# Patient Record
Sex: FEMALE | Race: WHITE | HISPANIC OR LATINO | URBAN - METROPOLITAN AREA
[De-identification: names, ages, dates, MRNs, and addresses within clinical notes are randomized per-mention and may not be internally consistent; named-entity substitution may affect disease eponyms.]

---

## 2021-10-05 ENCOUNTER — NEW PATIENT (OUTPATIENT)
Dept: URBAN - METROPOLITAN AREA CLINIC 110 | Facility: CLINIC | Age: 81
End: 2021-10-05

## 2021-10-05 DIAGNOSIS — H04.123: ICD-10-CM

## 2021-10-05 DIAGNOSIS — H35.371: ICD-10-CM

## 2021-10-05 DIAGNOSIS — H53.2: ICD-10-CM

## 2021-10-05 DIAGNOSIS — H52.4: ICD-10-CM

## 2021-10-05 DIAGNOSIS — Z96.1: ICD-10-CM

## 2021-10-05 DIAGNOSIS — Z79.899: ICD-10-CM

## 2021-10-05 PROCEDURE — 92015 DETERMINE REFRACTIVE STATE: CPT

## 2021-10-05 PROCEDURE — 92004 COMPRE OPH EXAM NEW PT 1/>: CPT

## 2021-10-05 PROCEDURE — 92134 CPTRZ OPH DX IMG PST SGM RTA: CPT

## 2021-10-05 ASSESSMENT — TONOMETRY
OS_IOP_MMHG: 15
OD_IOP_MMHG: 15

## 2021-10-05 ASSESSMENT — VISUAL ACUITY
OD_PH: 20/60+1
OD_SC: 20/70
OS_SC: 20/60-1
OS_PH: 20/60

## 2022-02-27 ENCOUNTER — APPOINTMENT (EMERGENCY)
Dept: RADIOLOGY | Facility: HOSPITAL | Age: 82
End: 2022-02-27
Payer: COMMERCIAL

## 2022-02-27 ENCOUNTER — HOSPITAL ENCOUNTER (EMERGENCY)
Facility: HOSPITAL | Age: 82
Discharge: HOME | End: 2022-02-27
Attending: EMERGENCY MEDICINE
Payer: COMMERCIAL

## 2022-02-27 VITALS
DIASTOLIC BLOOD PRESSURE: 77 MMHG | SYSTOLIC BLOOD PRESSURE: 179 MMHG | RESPIRATION RATE: 18 BRPM | HEART RATE: 98 BPM | OXYGEN SATURATION: 97 %

## 2022-02-27 DIAGNOSIS — S42.213A HUMERAL SURGICAL NECK FRACTURE: ICD-10-CM
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
## 2022-02-27 PROCEDURE — 70450 CT HEAD/BRAIN W/O DYE: CPT

## 2022-02-27 PROCEDURE — 99284 EMERGENCY DEPT VISIT MOD MDM: CPT | Performed by: EMERGENCY MEDICINE

## 2022-02-27 PROCEDURE — 73200 CT UPPER EXTREMITY W/O DYE: CPT

## 2022-02-27 PROCEDURE — 99284 EMERGENCY DEPT VISIT MOD MDM: CPT

## 2022-02-27 PROCEDURE — 73030 X-RAY EXAM OF SHOULDER: CPT

## 2022-02-27 PROCEDURE — G1004 CDSM NDSC: HCPCS

## 2022-02-27 PROCEDURE — 96372 THER/PROPH/DIAG INJ SC/IM: CPT

## 2022-02-27 RX ORDER — ACETAMINOPHEN 325 MG/1
650 TABLET ORAL ONCE
Status: COMPLETED | OUTPATIENT
Start: 2022-02-27 | End: 2022-02-27

## 2022-02-27 RX ORDER — LIDOCAINE 50 MG/G
1 PATCH TOPICAL ONCE
Status: DISCONTINUED | OUTPATIENT
Start: 2022-02-27 | End: 2022-02-27 | Stop reason: HOSPADM

## 2022-02-27 RX ORDER — KETOROLAC TROMETHAMINE 30 MG/ML
15 INJECTION, SOLUTION INTRAMUSCULAR; INTRAVENOUS ONCE
Status: COMPLETED | OUTPATIENT
Start: 2022-02-27 | End: 2022-02-27

## 2022-02-27 RX ADMIN — LIDOCAINE 5% 1 PATCH: 700 PATCH TOPICAL at 16:50

## 2022-02-27 RX ADMIN — KETOROLAC TROMETHAMINE 15 MG: 30 INJECTION, SOLUTION INTRAMUSCULAR at 16:47

## 2022-02-27 RX ADMIN — ACETAMINOPHEN 650 MG: 325 TABLET ORAL at 16:49

## 2022-02-27 NOTE — DISCHARGE INSTRUCTIONS
You were seen today in the Emergency Department  Please take tylenol and ibuprofen for pain  Please follow up with your Primary Care Provider in the next 1-2 days to recheck your symptoms and to follow up on your visit to the Emergency Department today  Please also follow up with the specialists to whom I have referred you as directed on this page  Please return to the Emergency Department if you have fevers, chills, chest pain, shortness of breath, are unable to eat or drink, or have any other symptoms that concern you  Please look this over and let your nurse and/or me know if you have any further questions before you leave

## 2022-02-27 NOTE — ED ATTENDING ATTESTATION
2/27/2022  I, Alison Lai MD, saw and evaluated the patient  I have discussed the patient with the resident/non-physician practitioner and agree with the resident's/non-physician practitioner's findings, Plan of Care, and MDM as documented in the resident's/non-physician practitioner's note, except where noted  All available labs and Radiology studies were reviewed  I was present for key portions of any procedure(s) performed by the resident/non-physician practitioner and I was immediately available to provide assistance  At this point I agree with the current assessment done in the Emergency Department  I have conducted an independent evaluation of this patient a history and physical is as follows:    73-year-old woman [place]  Struck the right side of her head on the ground  Denies loss of consciousness  Not on anticoagulation  Complaining of right-sided head pain as well as right shoulder pain  No neck pain  No other complaints  Awake and alert, uncomfortable appearing  Head atraumatic normocephalic  No C-spine tenderness  There is right shoulder tenderness  Neurovascular intact  Heart regular rate rhythm, no murmurs rubs or gallops  Lungs clear to auscultation bilaterally  Imaging shows humeral head fracture, discussed with Orthopedics and will place sling with plan for outpatient follow-up  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

## 2022-02-27 NOTE — ED PROVIDER NOTES
History  Chief Complaint   Patient presents with    Fall     fall at [facility]  negative LOC  PT reports right shoulder pain     Manuela Hammer is an [de-identified]y o  year old female who presents to the ED today for evaluation of a fall that took place at the [facility] just PTA  She lost her footing and fell onto her right side, hitting the right side of her head on the ground  She tried to catch herself with her R arm  No LOC  No blood thinners  Is having pain on the right side of the head and R anterior shoulder  Was able to get off the ground without assistance and has been ambulatory since the fall without pain or unsteadiness  The patient denies headaches, lightheadedness or syncope, nausea, vomiting, chest pain, shortness of breath, abdominal pain, new back pain, numbness or tingling, pain anywhere else in body  ROS otherwise negative  History provided by:  Medical records and patient   used: No    Fall      None       History reviewed  No pertinent past medical history  History reviewed  No pertinent surgical history  History reviewed  No pertinent family history  I have reviewed and agree with the history as documented  E-Cigarette/Vaping     E-Cigarette/Vaping Substances     Social History     Tobacco Use    Smoking status: Not on file    Smokeless tobacco: Never Used   Substance Use Topics    Alcohol use: Not Currently    Drug use: Not on file        Review of Systems   Reason unable to perform ROS: please see above for ROS  All other ROS negative         Physical Exam  ED Triage Vitals   Temp Pulse Respirations Blood Pressure SpO2   -- 02/27/22 1439 02/27/22 1439 02/27/22 1439 02/27/22 1439    98 18 (!) 179/77 97 %      Temp src Heart Rate Source Patient Position - Orthostatic VS BP Location FiO2 (%)   -- 02/27/22 1439 02/27/22 1439 02/27/22 1439 --    Monitor Sitting Left arm       Pain Score       02/27/22 1647       8             Orthostatic Vital Signs  Vitals:    02/27/22 1439   BP: (!) 179/77   Pulse: 98   Patient Position - Orthostatic VS: Sitting       Physical Exam  Vitals and nursing note reviewed  Constitutional:       General: She is not in acute distress  Appearance: She is well-developed  She is not diaphoretic  HENT:      Head: Normocephalic and atraumatic  Eyes:      General: No scleral icterus  Conjunctiva/sclera: Conjunctivae normal    Neck:      Vascular: No JVD  Trachea: No tracheal deviation  Cardiovascular:      Rate and Rhythm: Normal rate and regular rhythm  Comments: No ttp chest wall  Pulmonary:      Effort: Pulmonary effort is normal  No respiratory distress  Breath sounds: Normal breath sounds  Abdominal:      General: There is no distension  Palpations: Abdomen is soft  Tenderness: There is no abdominal tenderness  Musculoskeletal:         General: No deformity  Cervical back: Neck supple  Comments: ttp R anterior shoulder joint, ROM and strength testing limited due to pain  No tenderness or deformity of clavicle or more distal regions of RUE  No sensory or pulse deficits RUE  No other evidence of trauma of extremities  No midline spinal tenderness   Skin:     General: Skin is warm and dry  Neurological:      Mental Status: She is alert  Psychiatric:         Behavior: Behavior normal          ED Medications  Medications   acetaminophen (TYLENOL) tablet 650 mg (650 mg Oral Given 2/27/22 1649)   ketorolac (TORADOL) injection 15 mg (15 mg Intramuscular Given 2/27/22 1647)       Diagnostic Studies  Results Reviewed     None                 CT upper extremity wo contrast right   Final Result by Armand Berg MD (02/27 1347)      1  Proximal humeral fracture as described   2   9 mm right upper lobe pulmonary nodule with spiculation, may represent malignancy  Based on current Fleischner Society 2017 Guidelines on incidental pulmonary nodule, recommend follow-up PET/CT        The study was marked in EPIC for significant notification  Workstation performed: [id number]         CT head without contrast   Final Result by Rakesh Briscoe DO (02/27 1531)      No acute intracranial abnormality  Microangiopathic changes  Workstation performed: [id number]         XR shoulder 2+ views RIGHT    (Results Pending)         Procedures  Procedures      ED Course                                       MDM  Number of Diagnoses or Management Options  Diagnosis management comments: Initial ED diagnostics to include CT head, x-ray right shoulder  Initial ddx includes but is not limited to: contusion, fx, dislocation, soft tissue injury, TBI  Anticipate ultimate dispo to be d/c with RTER precautions and supportive care as needed if imaging unremarkable  Amount and/or Complexity of Data Reviewed  Tests in the radiology section of CPT®: ordered and reviewed  Review and summarize past medical records: yes    Patient Progress  Patient progress: stable      Disposition  Final diagnoses:   Fall, initial encounter   Humeral surgical neck fracture     Time reflects when diagnosis was documented in both MDM as applicable and the Disposition within this note     Time User Action Codes Description Comment    2/27/2022  4:29 PM Blanquita Bell Add [Z01  AVMF] Fall, initial encounter     2/27/2022  4:29 PM Blanquita Bell Add [L31 877G] Humeral surgical neck fracture       ED Disposition     ED Disposition Condition Date/Time Comment    Discharge Stable Sun Feb 27, 2022  4:29 PM Juana Edmonds discharge to home/self care  Results of completed tests discussed  Return to ER precautions given, verbal and written, and questions answered satisfactorily                  Follow-up Information     Follow up With Specialties Details Why Contact Info Additional [address] [facility] Orthopedic Surgery Call in 1 day For specialty evaluation and/or treatment [address] [phone]  [address] [facility] [place], [address] [place] [address], [address]  [facility]          There are no discharge medications for this patient  No discharge procedures on file  PDMP Review     None           ED Provider  Attending physically available and evaluated Tj Duarte I managed the patient along with the ED Attending      Electronically Signed by         Rossy Martinez DO  02/27/22 6798

## 2022-12-01 ENCOUNTER — ESTABLISHED COMPREHENSIVE EXAM (OUTPATIENT)
Dept: URBAN - METROPOLITAN AREA CLINIC 110 | Facility: CLINIC | Age: 82
End: 2022-12-01

## 2022-12-01 DIAGNOSIS — H35.371: ICD-10-CM

## 2022-12-01 DIAGNOSIS — Z79.61: ICD-10-CM

## 2022-12-01 DIAGNOSIS — H52.4: ICD-10-CM

## 2022-12-01 DIAGNOSIS — Z96.1: ICD-10-CM

## 2022-12-01 DIAGNOSIS — M06.9: ICD-10-CM

## 2022-12-01 DIAGNOSIS — H04.123: ICD-10-CM

## 2022-12-01 PROCEDURE — 92015 DETERMINE REFRACTIVE STATE: CPT

## 2022-12-01 PROCEDURE — 92134 CPTRZ OPH DX IMG PST SGM RTA: CPT

## 2022-12-01 PROCEDURE — 92014 COMPRE OPH EXAM EST PT 1/>: CPT

## 2022-12-01 ASSESSMENT — TONOMETRY
OD_IOP_MMHG: 14
OS_IOP_MMHG: 11

## 2022-12-01 ASSESSMENT — VISUAL ACUITY
OS_PH: 20/40-1
OD_SC: 20/50-1
OS_SC: 20/50
OD_PH: 20/50+1

## 2023-01-06 ENCOUNTER — ESTABLISHED (OUTPATIENT)
Dept: URBAN - METROPOLITAN AREA CLINIC 110 | Facility: CLINIC | Age: 83
End: 2023-01-06

## 2023-01-06 DIAGNOSIS — Z79.61: ICD-10-CM

## 2023-01-06 DIAGNOSIS — H04.123: ICD-10-CM

## 2023-01-06 DIAGNOSIS — H35.371: ICD-10-CM

## 2023-01-06 DIAGNOSIS — M06.9: ICD-10-CM

## 2023-01-06 PROCEDURE — 92012 INTRM OPH EXAM EST PATIENT: CPT

## 2023-01-06 PROCEDURE — 92083 EXTENDED VISUAL FIELD XM: CPT

## 2023-01-06 ASSESSMENT — TONOMETRY
OS_IOP_MMHG: 9
OD_IOP_MMHG: 11

## 2023-01-06 ASSESSMENT — VISUAL ACUITY
OD_CC: 20/50+3
OU_CC: J1
OS_CC: 20/40